# Patient Record
Sex: FEMALE | Race: WHITE
[De-identification: names, ages, dates, MRNs, and addresses within clinical notes are randomized per-mention and may not be internally consistent; named-entity substitution may affect disease eponyms.]

---

## 2019-01-08 ENCOUNTER — HOSPITAL ENCOUNTER (OUTPATIENT)
Dept: HOSPITAL 76 - DI | Age: 74
Discharge: HOME | End: 2019-01-08
Attending: INTERNAL MEDICINE
Payer: MEDICARE

## 2019-01-08 DIAGNOSIS — I51.9: ICD-10-CM

## 2019-01-08 DIAGNOSIS — I25.5: Primary | ICD-10-CM

## 2019-01-08 PROCEDURE — 93306 TTE W/DOPPLER COMPLETE: CPT

## 2020-02-04 ENCOUNTER — HOSPITAL ENCOUNTER (OUTPATIENT)
Dept: HOSPITAL 76 - DI | Age: 75
Discharge: HOME | End: 2020-02-04
Attending: INTERNAL MEDICINE
Payer: MEDICARE

## 2020-02-04 DIAGNOSIS — Z12.31: Primary | ICD-10-CM

## 2020-02-04 DIAGNOSIS — Z90.11: ICD-10-CM

## 2020-02-04 DIAGNOSIS — Z92.3: ICD-10-CM

## 2020-02-04 DIAGNOSIS — Z85.3: ICD-10-CM

## 2020-02-04 PROCEDURE — 77063 BREAST TOMOSYNTHESIS BI: CPT

## 2020-02-18 NOTE — MAMMOGRAPHY REPORT
Reason:  ROUTINE MAMMO

Procedure Date:  02/04/2020   

Accession Number:  [id number] / [id number]                    

Procedure:  JONEL - Screening Mammo Left w/Pablo CPT Code:  

 

***Final Report***

 

 

FULL RESULT:

 

 

EXAM: Screening Mammo Left w/Pablo

 

DATE: 2/4/2020 4:16 PM

 

CLINICAL HISTORY:  Screening encounter. History of benign left breast 

biopsy. Personal history of right breast cancer status post mastectomy in 

2013.

 

TECHNIQUE: (L) - Left  CC and MLO views were obtained.

 

COMPARISON: None

 

PARENCHYMAL PATTERN: (D) - The breast(s) demonstrate(s) heterogeneously 

dense fibroglandular parenchyma.

 

FINDINGS:

Postsurgical changes consistent was provided history of prior biopsy are 

identified. There are coarse typically benign calcifications. There are 

no suspicious masses, calcifications, or areas of distortion.

 

IMPRESSION: Benign findings. BI-RADS category 2.

 

RECOMMENDATION: (ANNUAL)  - Recommend routine annual screening 

mammography.

 

BI-RADS CATEGORY: (2) - Benign Findings.

 

STANDARD QUALIFYING STATEMENTS:

1.  This examination was not reviewed with the aid of Computer-Aided 

Detection (CAD).

2.  A negative or benign  imaging report should not preclude biopsy if 

clinically suspicious findings are present.

3.  Dense breasts may obscure an underlying neoplasm.

4. This examination was reviewed with the aid of 3D breast imaging 

(tomosynthesis).

## 2020-08-20 ENCOUNTER — HOSPITAL ENCOUNTER (OUTPATIENT)
Dept: HOSPITAL 76 - DI | Age: 75
Discharge: HOME | End: 2020-08-20
Attending: INTERNAL MEDICINE
Payer: MEDICARE

## 2020-08-20 DIAGNOSIS — M25.712: Primary | ICD-10-CM

## 2020-08-20 NOTE — XRAY REPORT
PROCEDURE:  Shoulder 3 View LT

 

INDICATIONS:  LT SHOULDER PAIN

 

TECHNIQUE:  3 views of the shoulder were acquired.  

 

COMPARISON:  Chest plain film which allowing excellent visualization of the left shoulder from 4/15/2
018 is reviewed..

 

FINDINGS:  

 

Bones:  No definite fractures or dislocations, but there are suspicious bony lesions involving the di
stal clavicle where osteolytic change appears present, multifocal, predominantly involving the middle
 and distal thirds of the left clavicle to the degree that there appears to be potential for patholog
ic fracture. In contrast to the small portion of the remaining visualized chest does not show definit
e similar bony lesions..  Visualized ribs appear intact.  

 

Soft tissues:  No suspicious soft tissue calcifications.  

 

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
 without similar additional lesions visualized over the left shoulder elsewhere in the underlying robb
st wall osseous elements. Recommend consideration of skeletal survey if multiple myeloma is clinicall
y suspected. Nuclear medicine bone scan also may be warranted at this time.

 

Reviewed by: Ascencion Raza MD on 8/20/2020 5:06 PM PDT

Approved by: Ascencion Raza MD on 8/20/2020 5:06 PM PDT

 

 

Station ID:  [id number]

## 2020-11-24 ENCOUNTER — HOSPITAL ENCOUNTER (OUTPATIENT)
Dept: HOSPITAL 76 - DI | Age: 75
Discharge: HOME | End: 2020-11-24
Attending: INTERNAL MEDICINE
Payer: MEDICARE

## 2020-11-24 DIAGNOSIS — R93.7: Primary | ICD-10-CM

## 2020-11-24 DIAGNOSIS — M16.0: ICD-10-CM

## 2020-11-24 DIAGNOSIS — Z85.3: ICD-10-CM

## 2020-11-24 DIAGNOSIS — M47.817: ICD-10-CM

## 2020-11-24 DIAGNOSIS — M47.812: ICD-10-CM

## 2020-11-24 PROCEDURE — 77075 RADEX OSSEOUS SURVEY COMPL: CPT

## 2020-11-25 NOTE — XRAY REPORT
PROCEDURE: Skeletal Survey

 

INDICATIONS: LYCTIC LESIONS LT CLAVICLE

 

TECHNIQUE: AP and lateral views of the spine as well as extremities and pelvis were obtained.

 

COMPARISON: None.

 

FINDINGS: 

As identified on prior exam, lytic appearance is noted within the distal left clavicle. Similar appea
perez is also noted within the left inferior pubic ramus. No additional areas of lytic abnormality ar
e identified. There is a questionable appearance of healing pathologic fracture within the distal cla
vicle.

 

Multilevel degenerative changes are present within the cervical spine including disc space narrowing 
and anterior osteophytes. Multilevel degenerative changes including disc and foraminal narrowing are 
present within the lumbar spine most notable at L5-S1. Multilevel anterior osteophytes are present. B
ilateral degenerative hip joint space narrowing is present, moderate in degree.

 

IMPRESSION: 

Lytic foci within the left distal clavicle as well as inferior left pubic ramus. Overall, there are c
oncerning for malignancy, whether metastatic or potentially myelomatous disease. Further evaluation w
ith bone scan may be obtained.

 

Reviewed by: Greta Brown MD on 11/25/2020 1:24 PM [facility]

Approved by: Greta Brown MD on 11/25/2020 1:24 PM [facility]

 

 

Station ID:  [id number]

## 2021-06-27 ENCOUNTER — HOSPITAL ENCOUNTER (EMERGENCY)
Dept: HOSPITAL 76 - ED | Age: 76
Discharge: HOME | End: 2021-06-27
Payer: MEDICARE

## 2021-06-27 ENCOUNTER — HOSPITAL ENCOUNTER (OUTPATIENT)
Dept: HOSPITAL 76 - EMS | Age: 76
End: 2021-06-27
Payer: MEDICARE

## 2021-06-27 VITALS — DIASTOLIC BLOOD PRESSURE: 84 MMHG | SYSTOLIC BLOOD PRESSURE: 149 MMHG

## 2021-06-27 DIAGNOSIS — Z79.82: ICD-10-CM

## 2021-06-27 DIAGNOSIS — Z95.1: ICD-10-CM

## 2021-06-27 DIAGNOSIS — K59.00: Primary | ICD-10-CM

## 2021-06-27 DIAGNOSIS — R14.0: ICD-10-CM

## 2021-06-27 DIAGNOSIS — E86.0: ICD-10-CM

## 2021-06-27 DIAGNOSIS — I10: ICD-10-CM

## 2021-06-27 DIAGNOSIS — E11.9: ICD-10-CM

## 2021-06-27 DIAGNOSIS — Z85.3: ICD-10-CM

## 2021-06-27 DIAGNOSIS — Z79.84: ICD-10-CM

## 2021-06-27 DIAGNOSIS — R10.30: Primary | ICD-10-CM

## 2021-06-27 DIAGNOSIS — Z08: ICD-10-CM

## 2021-06-27 PROCEDURE — 99284 EMERGENCY DEPT VISIT MOD MDM: CPT

## 2021-06-27 PROCEDURE — 99283 EMERGENCY DEPT VISIT LOW MDM: CPT

## 2021-06-27 NOTE — ED PHYSICIAN DOCUMENTATION
PD HPI ABD PAIN





- Stated complaint


Stated Complaint: STOMACH PX





- Chief complaint


Chief Complaint: Abd Pain





- History obtained from


History obtained from: Patient, Family





- History of Present Illness


Timing - onset: How many days ago (5)


Timing - duration: Days (5)


Timing - details: Gradual onset, Still present


Quality: Cramping, Fullness/distended, Pain


Location: All over / everywhere


Improved by: Position


Worsened by: Position, Palpation


Associated symptoms: Constipation.  No: Fever, Nausea, Vomiting


Similar symptoms before: Has not had sx before


Recently seen: Not recently seen





- Additional information


Additional information: 





75-year-old female with history of diabetes hypertension breast cancer and s/p 

CABG has developed constipation.  She is not usually constipated and does not 

recall going 5 days without a bowel movement in her life.  She has started on 

some furosemide in addition to the Aldactone she normally takes and she has had 

some improvement in her leg swelling.





Review of Systems


Constitutional: denies: Fever


Nose: denies: Congestion


Respiratory: denies: Cough


GI: reports: Abdominal Pain, Constipation.  denies: Nausea, Vomiting, Diarrhea


: denies: Dysuria, Frequency


Skin: denies: Rash


Musculoskeletal: denies: Neck pain, Back pain, Extremity pain


Neurologic: denies: Generalized weakness, Focal weakness, Numbness





PD PAST MEDICAL HISTORY





- Past Medical History


Cardiovascular: High cholesterol


Endocrine/Autoimmune: Type 2 diabetes


GI: None


: None


HEENT: None


Psych: None


Musculoskeletal: None


Derm: None





- Past Surgical History


Past Surgical History: Yes


/GYN: Mastectomy





- Present Medications


Home Medications: 


                                Ambulatory Orders











 Medication  Instructions  Recorded  Confirmed


 


Aspirin Chewable [[place] Santos 162 mg PO DAILY 06/27/21 06/27/21





Aspirin]   


 


Atorvastatin Calcium 40 mg PO DAILY 06/27/21 06/27/21


 


Atorvastatin [Lipitor] 40 mg PO QPM 06/27/21 06/27/21


 


Furosemide [Lasix] 80 mg PO DAILY 06/27/21 06/27/21


 


Lactobacillus Combination No.4 1 cap PO DAILY 06/27/21 06/27/21





[Probiotic]   


 


Lisinopril [Zestril] 20 mg PO BID 06/27/21 06/27/21


 


Metoprolol Succinate [Toprol Xl] 100 mg PO DAILY 06/27/21 06/27/21


 


Multivitamin 1 tab PO DAILY 06/27/21 06/27/21


 


Potassium Chloride 10 meq PO DAILY 06/27/21 06/27/21


 


Spironolactone [Aldactone] 25 mg PO DAILY 06/27/21 06/27/21


 


glipiZIDE [Glucotrol] 5 mg PO DAILY 06/27/21 06/27/21


 


metFORMIN [Glucophage] 1,000 mg PO BID 06/27/21 06/27/21


 


metFORMIN [Glucophage] 500 mg PO BID 06/27/21 06/27/21














- Allergies


Allergies/Adverse Reactions: 


                                    Allergies











Allergy/AdvReac Type Severity Reaction Status Date / Time


 


No Known Drug Allergies Allergy   Verified 06/27/21 08:54














- Social History


Does the pt smoke?: No


Smoking Status: Never smoker





PD ED PE NORMAL





- Vitals


Vital signs reviewed: Yes (Tachycardic and hypertensive)





- General


General: Alert and oriented X 3, No acute distress, Well developed/nourished





- HEENT


HEENT: Atraumatic, PERRL, EOMI





- Cardiac


Cardiac: RRR, No murmur





- Respiratory


Respiratory: No respiratory distress, Clear bilaterally





- Abdomen


Abdomen: Normal bowel sounds, Soft, Non distended, No organomegaly, Other (Mild 

generalized tenderness without guarding or rebound tenderness.)





- Back


Back: No CVA TTP, No spinal TTP





- Derm


Derm: Normal color, Warm and dry, No rash





- Extremities


Extremities: No deformity, No edema





- Neuro


Neuro: Alert and oriented X 3, CNs 2-12 intact, No motor deficit, No sensory 

deficit, Normal speech


Eye Opening: Spontaneous


Motor: Obeys Commands


Verbal: Oriented


GCS Score: 15





- Psych


Psych: Normal mood, Normal affect





Results





- Vitals


Vitals: 


                               Vital Signs - 24 hr











  06/27/21 06/27/21 06/27/21





  08:49 10:53 12:00


 


Temperature 36.7 C  


 


Heart Rate 112 H 114 H 60


 


Respiratory 18 20 18





Rate   


 


Blood Pressure 151/76 H 170/100 H 149/84 H


 


O2 Saturation 97 99 100








                                     Oxygen











O2 Source                      Room air

















PD MEDICAL DECISION MAKING





- ED course


Complexity details: reviewed results, re-evaluated patient, considered 

differential, d/w patient, d/w family


ED course: 





75-year-old female accompanied by her  comes to the emergency department 

with acute complaint of constipation and resulting abdominal pain.  She has been

 on some diuretic and is a bit dehydrated.  I did not find other specific reason

 for the patient to be constipated but she did respond to treatment with an 

enema.  She produced a large quantity of stool and felt much improved.





Departure





- Departure


Disposition: 01 Home, Self Care


Clinical Impression: 


Constipation


Qualifiers:


 Constipation type: unspecified constipation type Qualified Code(s): K59.00 - 

Constipation, unspecified





Condition: Stable


Instructions:  ED Constipation


Follow-Up: 


Greg Mcdaniels MD [Primary Care Provider] - 


Discharge Date/Time: 06/27/21 12:37

## 2021-09-02 ENCOUNTER — HOSPITAL ENCOUNTER (OUTPATIENT)
Dept: HOSPITAL 76 - DI | Age: 76
Discharge: HOME | End: 2021-09-02
Attending: INTERNAL MEDICINE
Payer: MEDICARE

## 2021-09-02 DIAGNOSIS — I11.0: Primary | ICD-10-CM

## 2021-09-02 DIAGNOSIS — N28.1: ICD-10-CM

## 2021-09-02 DIAGNOSIS — N20.0: ICD-10-CM

## 2021-09-02 DIAGNOSIS — I48.91: ICD-10-CM

## 2021-09-02 DIAGNOSIS — I50.9: ICD-10-CM

## 2021-09-02 DIAGNOSIS — I34.0: ICD-10-CM

## 2021-09-02 DIAGNOSIS — I27.20: ICD-10-CM

## 2021-09-02 PROCEDURE — 93306 TTE W/DOPPLER COMPLETE: CPT

## 2021-09-02 PROCEDURE — 93975 VASCULAR STUDY: CPT

## 2021-09-02 NOTE — ULTRASOUND REPORT
PROCEDURE:  Arterial Visceral Complete

 

INDICATIONS:  CHF,HTN

 

TECHNIQUE:  Real time scanning was performed of both kidneys, followed by Color and pulsed Doppler in
terrogation of the renal vessels.  

 

COMPARISON:  None.

 

FINDINGS:  Markedly suboptimal evaluation secondary to difficulty with breath-holding and bowel gas.

Aortic peak systolic velocity:  57.8 cm/s. Calcified plaque is noted.

 

Right side:  

Gray-scale imaging:  Kidney is 10.7 cm long;  No hydronephrosis.  No nephrolithiasis.  Renal cortex i
s normal in echogenicity.  No suspicious solid renal masses.  

Proximal renal artery peak systolic velocity:  99 cm/s.  

Mid renal artery peak systolic velocity:  89 cm/s.  

Distal renal artery peak systolic velocity:  54 cm/s.  

Renal vein:  Patent, without thrombus.  

Peak renal/aortic ratio (RAR):  1.7.  

 

Left side:  

Gray-scale imaging:  Kidney is 9.6 cm long;  No hydronephrosis. Left renal calculi with nonobstructiv
e appearance. 1.3 cm left renal cyst..  Renal cortex is normal in echogenicity.  No suspicious solid 
renal masses.  

Proximal renal artery peak systolic velocity:  37 cm/s.  

Mid-renal artery peak systolic velocity:  35 cm/s.  

Distal renal artery peak systolic velocity:  31 cm/s.  

Renal vein:  Patent, without thrombus.  

Peak renal/aortic ratio (RAR):  0.7.  

 

IMPRESSION:  

Overall, no specific sonographic criteria for renal artery stenosis however suboptimal examination se
condary to difficulty with breath-holding and overlying bowel gas.

 

 Nonobstructive left nephrolithiasis.

 

Simple appearing left renal cyst.

 

 

Reviewed by: Mac Mccormack MD on 9/2/2021 4:40 PM PDT

Approved by: Mac Mccormack MD on 9/2/2021 4:40 PM PDT

 

 

Station ID:  [id number]

## 2021-12-31 ENCOUNTER — HOSPITAL ENCOUNTER (OUTPATIENT)
Dept: HOSPITAL 76 - DI | Age: 76
Discharge: HOME | End: 2021-12-31
Attending: INTERNAL MEDICINE
Payer: MEDICARE

## 2021-12-31 DIAGNOSIS — I50.21: ICD-10-CM

## 2021-12-31 DIAGNOSIS — I25.10: ICD-10-CM

## 2021-12-31 DIAGNOSIS — I11.0: Primary | ICD-10-CM

## 2021-12-31 DIAGNOSIS — I34.0: ICD-10-CM

## 2021-12-31 PROCEDURE — 93306 TTE W/DOPPLER COMPLETE: CPT

## 2022-03-07 ENCOUNTER — HOSPITAL ENCOUNTER (OUTPATIENT)
Dept: HOSPITAL 76 - LAB.N | Age: 77
Discharge: HOME | End: 2022-03-07
Attending: NURSE PRACTITIONER
Payer: MEDICARE

## 2022-03-07 ENCOUNTER — HOSPITAL ENCOUNTER (OUTPATIENT)
Dept: HOSPITAL 76 - DI.N | Age: 77
Discharge: HOME | End: 2022-03-07
Attending: NURSE PRACTITIONER
Payer: MEDICARE

## 2022-03-07 DIAGNOSIS — I83.90: ICD-10-CM

## 2022-03-07 DIAGNOSIS — L97.909: Primary | ICD-10-CM

## 2022-03-07 PROCEDURE — 87181 SC STD AGAR DILUTION PER AGT: CPT

## 2022-03-07 PROCEDURE — 87205 SMEAR GRAM STAIN: CPT

## 2022-03-07 PROCEDURE — 87077 CULTURE AEROBIC IDENTIFY: CPT

## 2022-03-07 PROCEDURE — 87070 CULTURE OTHR SPECIMN AEROBIC: CPT

## 2022-03-07 NOTE — XRAY REPORT
PROCEDURE:  Tib/Fib LT

 

INDICATIONS:  NON-PRESSURE CHRONIC ULCER OF LOWER LEG

 

TECHNIQUE:  2 views of the tibia and fibula were acquired.  

 

COMPARISON:  None.

 

FINDINGS:  

 

No fracture or suspicious osseous lesion. No abnormal periosteal reaction identified. Surgical clips 
in the medial and posterior lower leg. No radiopaque foreign body otherwise.

 

IMPRESSION:  

No plain radiographic evidence of osteomyelitis. MRI recommended if there is continued clinical harshil
rn.

 

Reviewed by: Vern Youngblood MD on 3/7/2022 3:46 PM PST

Approved by: Vern Youngblood MD on 3/7/2022 3:46 PM PST

 

 

Station ID:  [id number]

## 2023-03-01 ENCOUNTER — HOSPITAL ENCOUNTER (EMERGENCY)
Dept: HOSPITAL 76 - ED | Age: 78
LOS: 1 days | Discharge: ANOTHER HOSPITAL | End: 2023-03-02
Payer: MEDICARE

## 2023-03-01 DIAGNOSIS — Z79.84: ICD-10-CM

## 2023-03-01 DIAGNOSIS — Z20.822: ICD-10-CM

## 2023-03-01 DIAGNOSIS — N30.80: Primary | ICD-10-CM

## 2023-03-01 DIAGNOSIS — M89.9: ICD-10-CM

## 2023-03-01 DIAGNOSIS — N13.2: ICD-10-CM

## 2023-03-01 DIAGNOSIS — E11.9: ICD-10-CM

## 2023-03-01 DIAGNOSIS — I48.91: ICD-10-CM

## 2023-03-01 DIAGNOSIS — I47.1: ICD-10-CM

## 2023-03-01 DIAGNOSIS — Z95.1: ICD-10-CM

## 2023-03-01 LAB
ALBUMIN DIAFP-MCNC: 2.6 G/DL (ref 3.2–5.5)
ALBUMIN DIAFP-MCNC: 3.2 G/DL (ref 3.2–5.5)
ALBUMIN/GLOB SERPL: 0.6 {RATIO} (ref 1–2.2)
ALBUMIN/GLOB SERPL: 0.6 {RATIO} (ref 1–2.2)
ALP SERPL-CCNC: 120 IU/L (ref 42–121)
ALP SERPL-CCNC: 150 IU/L (ref 42–121)
ALT SERPL W P-5'-P-CCNC: 27 IU/L (ref 10–60)
ALT SERPL W P-5'-P-CCNC: 32 IU/L (ref 10–60)
ANION GAP SERPL CALCULATED.4IONS-SCNC: 12 MMOL/L (ref 6–13)
ANION GAP SERPL CALCULATED.4IONS-SCNC: 18 MMOL/L (ref 6–13)
AST SERPL W P-5'-P-CCNC: 39 IU/L (ref 10–42)
AST SERPL W P-5'-P-CCNC: 44 IU/L (ref 10–42)
B PARAPERT DNA SPEC QL NAA+PROBE: NOT DETECTED
B PERT DNA SPEC QL NAA+PROBE: NOT DETECTED
BASE EXCESS BLDV CALC-SCNC: -4.4 MMOL/L
BASOPHILS NFR BLD AUTO: 0 10^3/UL (ref 0–0.1)
BASOPHILS NFR BLD AUTO: 0.5 %
BILIRUB BLD-MCNC: 0.2 MG/DL (ref 0.2–1)
BILIRUB BLD-MCNC: 1 MG/DL (ref 0.2–1)
BUN SERPL-MCNC: 28 MG/DL (ref 6–20)
BUN SERPL-MCNC: 28 MG/DL (ref 6–20)
C PNEUM DNA NPH QL NAA+NON-PROBE: NOT DETECTED
CALCIUM UR-MCNC: 8.4 MG/DL (ref 8.5–10.3)
CALCIUM UR-MCNC: 9.7 MG/DL (ref 8.5–10.3)
CHLORIDE SERPL-SCNC: 103 MMOL/L (ref 101–111)
CHLORIDE SERPL-SCNC: 96 MMOL/L (ref 101–111)
CLARITY UR REFRACT.AUTO: (no result)
CO2 BLDV-SCNC: 22.4 MMOL/L (ref 24–29)
CO2 SERPL-SCNC: 21 MMOL/L (ref 21–32)
CO2 SERPL-SCNC: 23 MMOL/L (ref 21–32)
CREAT SERPLBLD-SCNC: 1.2 MG/DL (ref 0.4–1)
CREAT SERPLBLD-SCNC: 1.2 MG/DL (ref 0.4–1)
EOSINOPHIL # BLD AUTO: 0.2 10^3/UL (ref 0–0.7)
EOSINOPHIL NFR BLD AUTO: 2.4 %
ERYTHROCYTE [DISTWIDTH] IN BLOOD BY AUTOMATED COUNT: 14.4 % (ref 12–15)
FLUAV RNA RESP QL NAA+PROBE: NOT DETECTED
GFRSERPLBLD MDRD-ARVRAT: 44 ML/MIN/{1.73_M2} (ref 89–?)
GFRSERPLBLD MDRD-ARVRAT: 44 ML/MIN/{1.73_M2} (ref 89–?)
GLOBULIN SER-MCNC: 4.3 G/DL (ref 2.1–4.2)
GLOBULIN SER-MCNC: 5.2 G/DL (ref 2.1–4.2)
GLUCOSE SERPL-MCNC: 201 MG/DL (ref 70–100)
GLUCOSE SERPL-MCNC: 358 MG/DL (ref 70–100)
GLUCOSE UR QL STRIP.AUTO: NEGATIVE MG/DL
HAEM INFLU B DNA SPEC QL NAA+PROBE: NOT DETECTED
HCO3 BLDMV-SCNC: 21.2 MMOL/L (ref 23–28)
HCOV 229E RNA SPEC QL NAA+PROBE: NOT DETECTED
HCOV HKU1 RNA UPPER RESP QL NAA+PROBE: NOT DETECTED
HCOV NL63 RNA ASPIRATE QL NAA+PROBE: NOT DETECTED
HCOV OC43 RNA SPEC QL NAA+PROBE: NOT DETECTED
HCT VFR BLD AUTO: 42.5 % (ref 37–47)
HGB UR QL STRIP: 12.9 G/DL (ref 12–16)
HMPV AG SPEC QL: NOT DETECTED
HPIV1 RNA NPH QL NAA+PROBE: NOT DETECTED
HPIV2 SPEC QL CULT: NOT DETECTED
HPIV3 AB TITR SER CF: NOT DETECTED {TITER}
HPIV4 RNA SPEC QL NAA+PROBE: NOT DETECTED
KETONES SERPL STRIP-SCNC: NEGATIVE MMOL/L
KETONES UR QL STRIP.AUTO: 15 MG/DL
LIPASE SERPL-CCNC: 32 U/L (ref 22–51)
LIPASE SERPL-CCNC: 36 U/L (ref 22–51)
LYMPHOCYTES # SPEC AUTO: 0.8 10^3/UL (ref 1.5–3.5)
LYMPHOCYTES NFR BLD AUTO: 9 %
M PNEUMO DNA SPEC QL NAA+PROBE: NOT DETECTED
MCH RBC QN AUTO: 27.4 PG (ref 27–31)
MCHC RBC AUTO-ENTMCNC: 30.4 G/DL (ref 32–36)
MCV RBC AUTO: 90.4 FL (ref 81–99)
MONOCYTES # BLD AUTO: 0.2 10^3/UL (ref 0–1)
MONOCYTES NFR BLD AUTO: 1.8 %
NEUTROPHILS # BLD AUTO: 7.2 10^3/UL (ref 1.5–6.6)
NEUTROPHILS # SNV AUTO: 8.3 X10^3/UL (ref 4.8–10.8)
NEUTROPHILS NFR BLD AUTO: 86.1 %
NITRITE UR QL STRIP.AUTO: NEGATIVE
NRBC # BLD AUTO: 0 /100WBC
NRBC # BLD AUTO: 0 X10^3/UL
PCO2 BLDV: 40.9 MMHG (ref 41–51)
PDW BLD AUTO: 11.5 FL (ref 7.9–10.8)
PH BLDV: 7.33 [PH] (ref 7.31–7.41)
PH UR STRIP.AUTO: 6 PH (ref 5–7.5)
PLATELET # BLD: 178 10^3/UL (ref 130–450)
PO2 BLDV: 61.1 MMHG (ref 25–47)
POTASSIUM SERPL-SCNC: 3.1 MMOL/L (ref 3.5–5)
POTASSIUM SERPL-SCNC: 4.6 MMOL/L (ref 3.5–5)
PROT SPEC-MCNC: 6.9 G/DL (ref 6.7–8.2)
PROT SPEC-MCNC: 8.4 G/DL (ref 6.7–8.2)
PROT UR STRIP.AUTO-MCNC: >=300 MG/DL
RBC # UR STRIP.AUTO: (no result) /UL
RBC # URNS HPF: (no result) /HPF (ref 0–5)
RBC MAR: 4.7 10^6/UL (ref 4.2–5.4)
RSV RNA RESP QL NAA+PROBE: NOT DETECTED
RV+EV RNA SPEC QL NAA+PROBE: NOT DETECTED
SAO2 DF BLDV: 90.8 % (ref 60–80)
SARS-COV-2 RNA PNL SPEC NAA+PROBE: NOT DETECTED
SODIUM SERPLBLD-SCNC: 135 MMOL/L (ref 135–145)
SODIUM SERPLBLD-SCNC: 138 MMOL/L (ref 135–145)
SP GR UR STRIP.AUTO: 1.02 (ref 1–1.03)
SQUAMOUS URNS QL MICRO: (no result)
UROBILINOGEN UR QL STRIP.AUTO: (no result) E.U./DL
UROBILINOGEN UR STRIP.AUTO-MCNC: NEGATIVE MG/DL
WBC # UR MANUAL: >25 /HPF (ref 0–5)

## 2023-03-01 PROCEDURE — 87086 URINE CULTURE/COLONY COUNT: CPT

## 2023-03-01 PROCEDURE — 87633 RESP VIRUS 12-25 TARGETS: CPT

## 2023-03-01 PROCEDURE — 80053 COMPREHEN METABOLIC PANEL: CPT

## 2023-03-01 PROCEDURE — 96376 TX/PRO/DX INJ SAME DRUG ADON: CPT

## 2023-03-01 PROCEDURE — 85025 COMPLETE CBC W/AUTO DIFF WBC: CPT

## 2023-03-01 PROCEDURE — 82009 KETONE BODYS QUAL: CPT

## 2023-03-01 PROCEDURE — 83690 ASSAY OF LIPASE: CPT

## 2023-03-01 PROCEDURE — 81001 URINALYSIS AUTO W/SCOPE: CPT

## 2023-03-01 PROCEDURE — 96361 HYDRATE IV INFUSION ADD-ON: CPT

## 2023-03-01 PROCEDURE — 99285 EMERGENCY DEPT VISIT HI MDM: CPT

## 2023-03-01 PROCEDURE — 99291 CRITICAL CARE FIRST HOUR: CPT

## 2023-03-01 PROCEDURE — 84484 ASSAY OF TROPONIN QUANT: CPT

## 2023-03-01 PROCEDURE — 81003 URINALYSIS AUTO W/O SCOPE: CPT

## 2023-03-01 PROCEDURE — 74177 CT ABD & PELVIS W/CONTRAST: CPT

## 2023-03-01 PROCEDURE — 87040 BLOOD CULTURE FOR BACTERIA: CPT

## 2023-03-01 PROCEDURE — 93005 ELECTROCARDIOGRAM TRACING: CPT

## 2023-03-01 PROCEDURE — 96375 TX/PRO/DX INJ NEW DRUG ADDON: CPT

## 2023-03-01 PROCEDURE — 96365 THER/PROPH/DIAG IV INF INIT: CPT

## 2023-03-01 PROCEDURE — 87077 CULTURE AEROBIC IDENTIFY: CPT

## 2023-03-01 PROCEDURE — 82803 BLOOD GASES ANY COMBINATION: CPT

## 2023-03-01 PROCEDURE — 36415 COLL VENOUS BLD VENIPUNCTURE: CPT

## 2023-03-01 PROCEDURE — 87150 DNA/RNA AMPLIFIED PROBE: CPT

## 2023-03-01 PROCEDURE — 87181 SC STD AGAR DILUTION PER AGT: CPT

## 2023-03-01 RX ADMIN — ONDANSETRON STA MG: 2 INJECTION INTRAMUSCULAR; INTRAVENOUS at 22:36

## 2023-03-01 RX ADMIN — SODIUM CHLORIDE STA MLS/HR: 9 INJECTION, SOLUTION INTRAVENOUS at 22:27

## 2023-03-01 RX ADMIN — SODIUM CHLORIDE STA MLS/HR: 9 INJECTION, SOLUTION INTRAVENOUS at 18:13

## 2023-03-01 RX ADMIN — ONDANSETRON STA MG: 2 INJECTION INTRAMUSCULAR; INTRAVENOUS at 18:09

## 2023-03-01 RX ADMIN — ADENOSINE STA MG: 3 INJECTION, SOLUTION INTRAVENOUS at 21:25

## 2023-03-01 RX ADMIN — SODIUM CHLORIDE, POTASSIUM CHLORIDE, SODIUM LACTATE AND CALCIUM CHLORIDE STA MLS/HR: 600; 310; 30; 20 INJECTION, SOLUTION INTRAVENOUS at 23:03

## 2023-03-01 RX ADMIN — WATER ONE ML: 1 INJECTION INTRAMUSCULAR; INTRAVENOUS; SUBCUTANEOUS at 20:42

## 2023-03-01 RX ADMIN — KETOROLAC TROMETHAMINE STA MG: 30 INJECTION, SOLUTION INTRAMUSCULAR at 18:59

## 2023-03-01 RX ADMIN — HYDROMORPHONE HYDROCHLORIDE STA MG: 1 INJECTION, SOLUTION INTRAMUSCULAR; INTRAVENOUS; SUBCUTANEOUS at 20:46

## 2023-03-01 RX ADMIN — INSULIN HUMAN STA UNIT: 100 INJECTION, SOLUTION PARENTERAL at 21:23

## 2023-03-01 RX ADMIN — SODIUM CHLORIDE STA MLS/HR: 9 INJECTION, SOLUTION INTRAVENOUS at 20:52

## 2023-03-01 RX ADMIN — METOPROLOL SUCCINATE SCH MG: 50 TABLET, EXTENDED RELEASE ORAL at 22:37

## 2023-03-01 RX ADMIN — WATER ONE ML: 1 INJECTION INTRAMUSCULAR; INTRAVENOUS; SUBCUTANEOUS at 20:09

## 2023-03-01 RX ADMIN — HYDROMORPHONE HYDROCHLORIDE STA MG: 1 INJECTION, SOLUTION INTRAMUSCULAR; INTRAVENOUS; SUBCUTANEOUS at 18:11

## 2023-03-01 RX ADMIN — METOPROLOL TARTRATE STA MG: 5 INJECTION INTRAVENOUS at 21:32

## 2023-03-01 NOTE — ED PHYSICIAN DOCUMENTATION
History of Present Illness





- Stated complaint


Stated Complaint: LLQ PX





- Chief complaint


Chief Complaint: Abd Pain





- Additonal information


Additional information: 





77-year-old woman presents to the emergency department for evaluation of acute 

onset left lower quadrant abdominal pain.  She states that for "quite some time"

she has been very tender in the lower abdomen making it difficult to move but it

got acutely worse this evening.  She reports daily bowel movements.  No melena 

hematochezia.  No chest pain or shortness of air.  She does have a history of 

congestive heart failure for which she is on a diuretic.  She is frequently inco

ntinent of urine because she cannot make it to the restroom fast enough.  She 

does have a history of previous kidney stones though this pain feels different 

and does not radiate.  She was given fentanyl and zofran IV by EMS with minimal 

improvement in symptoms.





Patient states she has never had a colonoscopy she simply does not want one.





PMH: Hypertension, diabetes, breast cancer, coronary artery disease status post 

CABG.





Review of Systems


Constitutional: denies: Fever, Chills


GI: reports: Abdominal Pain.  denies: Nausea, Vomiting, Constipation, Diarrhea


: reports: Reviewed and negative


Skin: reports: Reviewed and negative





PD PAST MEDICAL HISTORY





- Past Medical History


Past Medical History: Yes


Cardiovascular: Congestive heart failure, High cholesterol, Atrial fibrillation


Endocrine/Autoimmune: Type 2 diabetes


GI: None


GYN: Breast cancer


: None, Kidney stones


HEENT: None


Psych: None


Musculoskeletal: None


Derm: None


Other Past Medical History: lymphedema, chronic venous stasis ulcers





- Past Surgical History


Past Surgical History: Yes


/GYN: Mastectomy





- Present Medications


Home Medications: 


                                Ambulatory Orders











 Medication  Instructions  Recorded  Confirmed


 


Aspirin Chewable [St Santos 81 mg PO BID 06/27/21 03/02/23





Aspirin]   


 


Atorvastatin [Lipitor] 40 mg PO QPM 06/27/21 03/02/23


 


Lactobacillus Combination No.4 1 cap PO DAILY 06/27/21 03/02/23





[Probiotic]   


 


Metoprolol Succinate [Toprol Xl] 100 mg PO BID 06/27/21 03/02/23


 


Multivitamin 1 tab PO DAILY 06/27/21 03/02/23


 


glipiZIDE [Glucotrol] 5 mg PO DAILY 06/27/21 03/02/23


 


metFORMIN [Glucophage] 500 mg PO BID 06/27/21 03/02/23


 


Empagliflozin [Jardiance] 10 mg PO DAILY 03/29/22 03/02/23


 


Sacubitril/Valsartan [Entresto 97 1 tab PO BID 06/29/22 03/02/23





mg-103 mg Tablet]   


 


Bumetanide 0.5 mg PO BID 03/01/23 03/02/23


 


Acetaminophen [Tylenol] 500 mg PO Q4HR 03/02/23 03/02/23














- Allergies


Allergies/Adverse Reactions: 


                                    Allergies











Allergy/AdvReac Type Severity Reaction Status Date / Time


 


No Known Drug Allergies Allergy   Verified 03/01/23 17:27














- Social History


Does the pt smoke?: No


Smoking Status: Never smoker


Does the pt drink ETOH?: No


Does the pt have substance abuse?: No





- Immunizations


Immunizations are current?: Yes





PD ED PE NORMAL





- General


General: Alert and oriented X 3, No acute distress, Well developed/nourished





- HEENT


HEENT: Atraumatic





- Neck


Neck: Supple, no meningeal sign, No adenopathy





- Cardiac


Cardiac: RRR, No murmur





- Respiratory


Respiratory: No respiratory distress, Clear bilaterally





- Abdomen


Abdomen: Normal bowel sounds, Soft.  No: Non tender (Focal tenderness with mild 

guarding and rebound left lower quadrant of the abdomen.)





- Back


Back: No CVA TTP





- Derm


Derm: Normal color, Warm and dry





- Extremities


Extremities: No deformity





- Neuro


Neuro: Alert and oriented X 3, CNs 2-12 intact


Eye Opening: Spontaneous


Motor: Obeys Commands


Verbal: Oriented


GCS Score: 15





Results





- Vitals


Vitals: 


                               Vital Signs - 24 hr











  03/01/23 03/01/23 03/01/23





  17:24 18:52 19:07


 


Temperature 37 C  


 


Heart Rate 111 H 138 H 115 H


 


Respiratory 16 20 16





Rate   


 


Blood Pressure 211/93 H 224/117 H 203/16 H


 


O2 Saturation 99 95 92


 


If not protocol   





: Oxygen Flow,   





liters/minute   














  03/01/23 03/01/23 03/01/23





  20:12 20:41 21:22


 


Temperature   


 


Heart Rate 120 H 118 H 210 H


 


Respiratory 18 14 16





Rate   


 


Blood Pressure 149/67 H 157/75 H 132/87 H


 


O2 Saturation 97 94 97


 


If not protocol   





: Oxygen Flow,   





liters/minute   














  03/01/23 03/01/23 03/01/23





  21:30 21:35 21:40


 


Temperature 37.2 C  


 


Heart Rate 127 H 116 H 97


 


Respiratory 16  12





Rate   


 


Blood Pressure 130/68 130/68 111/72


 


O2 Saturation 97  92


 


If not protocol   





: Oxygen Flow,   





liters/minute   














  03/01/23 03/01/23 03/01/23





  22:30 23:00 23:34


 


Temperature   


 


Heart Rate 101 H 100 104 H


 


Respiratory 13 11 L 11 L





Rate   


 


Blood Pressure 167/87 H 115/64 105/73


 


O2 Saturation 100 99 99


 


If not protocol 2 2 2





: Oxygen Flow,   





liters/minute   














  03/02/23 03/02/23 03/02/23





  00:06 01:00 02:00


 


Temperature   


 


Heart Rate 106 H 99 99


 


Respiratory 17 11 L 17





Rate   


 


Blood Pressure 133/71 H 104/56 L 123/57 L


 


O2 Saturation 100 100 100


 


If not protocol 2 2 2





: Oxygen Flow,   





liters/minute   








                                     Oxygen











O2 Source                      Nasal cannula


 


Oxygen Flow Rate               2

















- EKG (time done)


  ** 1857


Rate: Rate (enter#) (142)


Rhythm: NSR


Intervals: Normal DE, Prolonged QT, LBBB


Compare to prior EKG: Changed from prior EKG


Computer interpretation: Agree with computer





  ** 2120


Rate: Rate (enter#) (200)


Rhythm: SVT


Computer interpretation: Agree with computer





  ** 2126


Rate: Rate (enter#) (118)


Rhythm: Sinus tachycardia


Intervals: Normal DE, Prolonged QT, Other (incomplete RBB)


Compare to prior EKG: Changed from prior EKG


Computer interpretation: Agree with computer





- Labs


Labs: 


                                  Microbiology











 03/01/23 19:59 Blood Culture (PCR) - Final





 Blood - Left Hand 


 


 03/01/23 19:56 Blood Culture - Preliminary





 Blood - Left Arm 


 


 03/01/23 19:59 Blood Culture - Preliminary





 Blood - Left Hand 








                                Laboratory Tests











  03/01/23 03/01/23 03/01/23





  18:23 18:44 18:44


 


WBC   8.3 


 


RBC   4.70 


 


Hgb   12.9 


 


Hct   42.5 


 


MCV   90.4 


 


MCH   27.4 


 


MCHC   30.4 L 


 


RDW   14.4 


 


Plt Count   178 


 


MPV   11.5 H 


 


Neut # (Auto)   7.2 H 


 


Lymph # (Auto)   0.8 L 


 


Mono # (Auto)   0.2 


 


Eos # (Auto)   0.2 


 


Baso # (Auto)   0.0 


 


Absolute Nucleated RBC   0.00 


 


Nucleated RBC %   0.0 


 


VBG pH   


 


VBG pCO2   


 


VBG pO2   


 


VBG HCO3   


 


VBG Total CO2   


 


VBG O2 Saturation   


 


VBG Base Excess   


 


Sodium    135


 


Potassium    4.6


 


Chloride    96 L


 


Carbon Dioxide    21


 


Anion Gap    18.0 H


 


BUN    28 H


 


Creatinine    1.2 H


 


Estimated GFR (MDRD)    44 L


 


Glucose    358 H


 


Calcium    9.7


 


Total Bilirubin    1.0


 


AST    44 H


 


ALT    32


 


Alkaline Phosphatase    150 H


 


Troponin I High Sens   


 


Total Protein    8.4 H


 


Albumin    3.2


 


Globulin    5.2 H


 


Albumin/Globulin Ratio    0.6 L


 


Lipase    36


 


Urine Color  YELLOW  


 


Urine Clarity  CLOUDY  


 


Urine pH  6.0  


 


Ur Specific Gravity  1.020  


 


Urine Protein  >=300 H  


 


Urine Glucose (UA)  NEGATIVE  


 


Urine Ketones  15 H  


 


Urine Occult Blood  LARGE H  


 


Urine Nitrite  NEGATIVE  


 


Urine Bilirubin  NEGATIVE  


 


Urine Urobilinogen  0.2 (NORMAL)  


 


Ur Leukocyte Esterase  NEGATIVE  


 


Urine RBC  TNTC H  


 


Urine WBC  >25 H  


 


Ur Squamous Epith Cells  RARE Squamous  


 


Urine Bacteria  Many H  


 


Ur Microscopic Review  INDICATED  


 


Urine Culture Comments  INDICATED  


 


Nasal Adenovirus (PCR)   


 


Nasal B. parapertussis DNA (PCR)   


 


Nasal Coronavir 229E PCR   


 


Nasal Coronavir HKU1 PCR   


 


Nasal Coronavir NL63 PCR   


 


Nasal Coronavir OC43 PCR   


 


Nasal Enterovir/Rhinovir PCR   


 


Nasal Influenza B PCR   


 


Nasal Influenza A PCR   


 


Nasal Parainfluen 1 PCR   


 


Nasal Parainfluen 2 PCR   


 


Nasal Parainfluen 3 PCR   


 


Nasal Parainfluen 4 PCR   


 


Nasal RSV (PCR)   


 


Nasal B.pertussis DNA PCR   


 


Nasal C.pneumoniae (PCR)   


 


Jacobo Human Metapneumo PCR   


 


Nasal M.pneumoniae (PCR)   


 


Nasal SARS-CoV-2 (PCR)   


 


Serum Ketones   














  03/01/23 03/01/23 03/01/23





  19:59 21:29 22:21


 


WBC   


 


RBC   


 


Hgb   


 


Hct   


 


MCV   


 


MCH   


 


MCHC   


 


RDW   


 


Plt Count   


 


MPV   


 


Neut # (Auto)   


 


Lymph # (Auto)   


 


Mono # (Auto)   


 


Eos # (Auto)   


 


Baso # (Auto)   


 


Absolute Nucleated RBC   


 


Nucleated RBC %   


 


VBG pH   


 


VBG pCO2   


 


VBG pO2   


 


VBG HCO3   


 


VBG Total CO2   


 


VBG O2 Saturation   


 


VBG Base Excess   


 


Sodium    138


 


Potassium    3.1 L


 


Chloride    103


 


Carbon Dioxide    23


 


Anion Gap    12.0


 


BUN    28 H


 


Creatinine    1.2 H


 


Estimated GFR (MDRD)    44 L


 


Glucose    201 H


 


Calcium    8.4 L


 


Total Bilirubin    0.2


 


AST    39


 


ALT    27


 


Alkaline Phosphatase    120


 


Troponin I High Sens   


 


Total Protein    6.9


 


Albumin    2.6 L


 


Globulin    4.3 H


 


Albumin/Globulin Ratio    0.6 L


 


Lipase    32


 


Urine Color   


 


Urine Clarity   


 


Urine pH   


 


Ur Specific Gravity   


 


Urine Protein   


 


Urine Glucose (UA)   


 


Urine Ketones   


 


Urine Occult Blood   


 


Urine Nitrite   


 


Urine Bilirubin   


 


Urine Urobilinogen   


 


Ur Leukocyte Esterase   


 


Urine RBC   


 


Urine WBC   


 


Ur Squamous Epith Cells   


 


Urine Bacteria   


 


Ur Microscopic Review   


 


Urine Culture Comments   


 


Nasal Adenovirus (PCR)   NOT DETECTED 


 


Nasal B. parapertussis DNA (PCR)   NOT DETECTED 


 


Nasal Coronavir 229E PCR   NOT DETECTED 


 


Nasal Coronavir HKU1 PCR   NOT DETECTED 


 


Nasal Coronavir NL63 PCR   NOT DETECTED 


 


Nasal Coronavir OC43 PCR   NOT DETECTED 


 


Nasal Enterovir/Rhinovir PCR   NOT DETECTED 


 


Nasal Influenza B PCR   NOT DETECTED 


 


Nasal Influenza A PCR   NOT DETECTED 


 


Nasal Parainfluen 1 PCR   NOT DETECTED 


 


Nasal Parainfluen 2 PCR   NOT DETECTED 


 


Nasal Parainfluen 3 PCR   NOT DETECTED 


 


Nasal Parainfluen 4 PCR   NOT DETECTED 


 


Nasal RSV (PCR)   NOT DETECTED 


 


Nasal B.pertussis DNA PCR   NOT DETECTED 


 


Nasal C.pneumoniae (PCR)   NOT DETECTED 


 


Jacobo Human Metapneumo PCR   NOT DETECTED 


 


Nasal M.pneumoniae (PCR)   NOT DETECTED 


 


Nasal SARS-CoV-2 (PCR)   NOT DETECTED 


 


Serum Ketones  SMALL H   NEGATIVE














  03/01/23 03/01/23





  22:21 22:21


 


WBC  


 


RBC  


 


Hgb  


 


Hct  


 


MCV  


 


MCH  


 


MCHC  


 


RDW  


 


Plt Count  


 


MPV  


 


Neut # (Auto)  


 


Lymph # (Auto)  


 


Mono # (Auto)  


 


Eos # (Auto)  


 


Baso # (Auto)  


 


Absolute Nucleated RBC  


 


Nucleated RBC %  


 


VBG pH   7.332


 


VBG pCO2   40.9 L


 


VBG pO2   61.1 H


 


VBG HCO3   21.2 L


 


VBG Total CO2   22.4 L


 


VBG O2 Saturation   90.8 H


 


VBG Base Excess   -4.4 L


 


Sodium  


 


Potassium  


 


Chloride  


 


Carbon Dioxide  


 


Anion Gap  


 


BUN  


 


Creatinine  


 


Estimated GFR (MDRD)  


 


Glucose  


 


Calcium  


 


Total Bilirubin  


 


AST  


 


ALT  


 


Alkaline Phosphatase  


 


Troponin I High Sens  254.2 H* 


 


Total Protein  


 


Albumin  


 


Globulin  


 


Albumin/Globulin Ratio  


 


Lipase  


 


Urine Color  


 


Urine Clarity  


 


Urine pH  


 


Ur Specific Gravity  


 


Urine Protein  


 


Urine Glucose (UA)  


 


Urine Ketones  


 


Urine Occult Blood  


 


Urine Nitrite  


 


Urine Bilirubin  


 


Urine Urobilinogen  


 


Ur Leukocyte Esterase  


 


Urine RBC  


 


Urine WBC  


 


Ur Squamous Epith Cells  


 


Urine Bacteria  


 


Ur Microscopic Review  


 


Urine Culture Comments  


 


Nasal Adenovirus (PCR)  


 


Nasal B. parapertussis DNA (PCR)  


 


Nasal Coronavir 229E PCR  


 


Nasal Coronavir HKU1 PCR  


 


Nasal Coronavir NL63 PCR  


 


Nasal Coronavir OC43 PCR  


 


Nasal Enterovir/Rhinovir PCR  


 


Nasal Influenza B PCR  


 


Nasal Influenza A PCR  


 


Nasal Parainfluen 1 PCR  


 


Nasal Parainfluen 2 PCR  


 


Nasal Parainfluen 3 PCR  


 


Nasal Parainfluen 4 PCR  


 


Nasal RSV (PCR)  


 


Nasal B.pertussis DNA PCR  


 


Nasal C.pneumoniae (PCR)  


 


Jacobo Human Metapneumo PCR  


 


Nasal M.pneumoniae (PCR)  


 


Nasal SARS-CoV-2 (PCR)  


 


Serum Ketones  














- Rads (name of study)


  ** CT abd w


Radiology: Final report received (Obstructing stone at the left UPJ with 

moderate hydronephrosis.  Left perinephric stranding striated left nephrogram 

and gas within the left renal collecting system most likely representing 

pyelonephritis secondary to a gas-forming organism.  Bladder consistent with 

emphysematous cystitis. ), See rad report, Other (Diffuse lytic lesions 

throughout the osseous structures with extensive bony destruction throughout the

 pelvis consistent with metastatic disease versus myeloma)





PD Medical Decision Making





- ED course


Complexity details: reviewed results, re-evaluated patient, considered 

differential, d/w patient, d/w family


ED course: 


77-year-old female who has a pmh of hypertension, diabetes, CAD status post 

CABGremote as well as a remote history of breast cancer status post mastectomy 

in 2013, presents to the emergency department with 1 week of increasing lower 

pelvic pain urinary incontinence difficulty walking and weakness.  She has had 

no fevers.





On presentation she has tachycardia but is normotensive.  She is afebrile. A 

broad differential was considered to include diverticulitis, urinary infections,

 pyelonephritis, kidney stones as well as metastatic disease.





I did obtain initial CBC that showed no leukocytosis or worrisome anemia.  Her 

electrolytes show a blood sugar 358 and small ketones.  She has a mildly 

elevated BUN and creatinine but in comparison to recent numbers it is 

essentially unchanged.  Mild chronic kidney disease.  Urinalysis which was 

completed via in and out cath is however frankly consistent with infection.





Here in the emergency department the patient was acutely in pain which improved 

after receiving IV Dilaudid. Upon the findings of small ketones in the serum 

there was concern for mild DKA given that she has a mildly elevated anion gap.  

Given a normal potassium I did administer 2 units of insulin as well as a total 

of 2 L of crystalloid.  Repeat electrolytes will be pending.





Subsequent CT imaging of the abdomen pelvis with contrast shows emphysematous 

cystitis with associated pyelonephritis.  She does have gas within the left 

renal collecting system.  There is also an obstructing left UPJ stone with 

associated hydro nephritis.  Given this finding blood cultures were obtained and

 2 g of ceftriaxone were administered IV.  With the finding of emphysematous 

cystitis a Solis catheter was placed in the emergency department.





Unfortunately the CT scan also showed diffuse lytic lesions throughout the bony 

structures consistent with metastatic disease versus myeloma.





Unfortunately with the obstructing stone she will need urological intervention. 

We are looking for a hospital with [facility] to accept in transfer and I have 

requested urology consult through [facility].  Locally there is a 

paucity of beds and transfer may prove exceedingly difficult.





2122: Notified by nursing staff that patient began to feel nauseated she 

immediately went into an SVT.  Baseline heart rate during this ED stay has been 

120 in sinus and she was seen to be in a narrow complex tachycardia with a 

sustained unchanging rate of 200.  She was immediately administered 6 mg of 

adenosine IV with subsequent return to sinus tachycardia with a heart rate of 

120.  She typically takes metoprolol 100 mg twice daily.  She is administered a 

single dose IV 5 mg at this time. on subsequent reevaluation she has a HR now of

 97 and BP of 111/70





2140: I spoke with Dr. Guzman urologist through [facility].  He 

agrees that the patient likely needs transfer for urinary stenting of the stone.

  He would recommend broadening the coverage of her antibiotics to include Zosyn

 for better gram-negative coverage.  Unfortunately [facility] does

 not have any beds.  Patient will be placed on their transfer list and we will 

continue to work with [facility] to obtain appropriate bed placement.  





I have ordered the patient's twice daily metoprolol succinate 100 mg to be 

administered.  I have also ordered the patient's Bumex that she typically takes 

if she has a history of heart failure.  Repeat labs are due at 2200.  We will 

reevaluate her anion gap as well as a presence of ketones in her chemistry and a

 troponin.  Patient will be signed out to my nighttime colleague Dr. Saini to 

follow-up on any overnight events including repeat labs and possibility of 

transfer








- Critical Care


Time(min): 45


Time Includes: Direct patient care, Review records, Reassess patient, Document 

care, Coordinate care, Medical consult


Data interpretation: Labs, Prior EKG


Procedures excluded from critical care time: See progress note





Departure





- Departure


Disposition: 02 Transfer Acute Care Hosp


Clinical Impression: 


 Emphysematous cystitis, Pyelonephritis, Obstruction of ureteropelvic junction 

(UPJ) due to stone, Lytic bone lesions on xray, SVT (supraventricular 

tachycardia)





Discharge Date/Time: 03/02/23 03:09

## 2023-03-01 NOTE — CT REPORT
PROCEDURE:  ABDOMEN/PELVIS W

 

INDICATIONS:  Abdominal pain, acute, nonlocalized

 

CONTRAST: 100mL Omni 300 

 

TECHNIQUE:  

After the administration of intravenous contrast, 5 mm thick sections acquired from the diaphragms to
 the symphysis.  5 mm thick coronal and sagittal reformats were acquired.  For radiation dose reducti
on, the following was used:  automated exposure control, adjustment of mA and/or kV according to faby
ent size.  

 

COMPARISON:  None.

 

FINDINGS:  

Image quality: There is mild motion artifact.  

 

Lung bases:There is mild dependent atelectasis.

Heart: Heart is normal in size.

 

ABDOMEN:

Liver: No mass lesion.

Gallbladder: There is a small amount of dependent high attenuation within the gallbladder which may 
represent gallstones or hyperdense biliary sludge. No gallbladder wall thickening or pericholecystic 
fluid.

Biliary ducts: No biliary ductal dilatation.

Pancreas: Unremarkable.

Spleen: Normal in size.

Adrenal Glands: No adrenal nodules.

Kidneys and Ureters:There is an obstructing urinary stone measuring up to 1.0 cm at the left uretero
pelvic junction with attenuation values of approximately 800-900 Hounsfield units. There is associate
d moderate left hydronephrosis with perinephric stranding. There is heterogeneous enhancement of the 
left kidney suggestive of a striated nephrogram. Foci of gas are demonstrated within the left renal c
ollecting system suspicious for infection from a gas-forming organism. No discrete perinephric or int
rarenal abscess. The right kidney demonstrates no hydronephrosis or definite renal stones. No definit
e evidence of pyelonephritis in the right kidney.

 

Stomach and Bowel: Stomach, small bowel loops, and colon are normal in caliber and wall thickness. N
o evidence of appendicitis.

 

Peritoneum: No abnormal intraperitoneal fluid. No free air.

 

Ventral Wall:  No hernia.

Abdominal Nodes: No retroperitoneal or mesenteric adenopathy by size criteria.

Vessels: Aorta and inferior vena cava are normal in size.

 

PELVIS:

Pelvic Organs: Unremarkable.

Bladder:There is bladder wall thickening with gas in the bladder wall compatible with emphysematous 
cystitis.

Pelvic Nodes: No enlarged lymph nodes.

Miscellaneous: No inguinal hernias.

 

Bones: There are diffuse lytic bony lesions throughout the bony pelvis within the sacrum, bilateral i
liac bones, left acetabulum, left ischial tuberosity, and left inferior pubic ramus with associated b
enio destruction. There are also suspected lesions in the femoral necks with associated small focus of
 cortical erosion anteriorly in the right femoral neck. Additional lesions are also suspected through
out the spine without lumbar compression fractures. The findings are consistent with metastatic disea
se.

 

IMPRESSION:  

 

1. Obstructing urinary stone at the left UPJ with moderate left hydronephrosis. Left perinephric stra
nding, striated left nephrogram, and gas within the left renal collecting system most likely represen
t pyonephritis secondary to a gas-forming organism. No perinephric or intrarenal abscess.

 

2. Gas within the bladder wall consistent with emphysematous cystitis.

 

3. Diffuse lytic lesions demonstrated throughout the visualized osseous structures with extensive bon
y destruction throughout the bony pelvis as described. The findings are consistent with metastatic di
sease versus myeloma.

 

Findings discussed with Janis Soria on 3/1/2023 at 8:14 PM.

 

Reviewed by: Sean Gutierrez MD on 3/1/2023 8:26 PM PST

Approved by: Sean Gutierrez MD on 3/1/2023 8:26 PM PST

 

 

Station ID:  [id number]

## 2023-03-02 ENCOUNTER — HOSPITAL ENCOUNTER (OUTPATIENT)
Dept: HOSPITAL 76 - EMS | Age: 78
Discharge: ANOTHER HOSPITAL | End: 2023-03-02
Attending: EMERGENCY MEDICINE
Payer: MEDICARE

## 2023-03-02 VITALS — SYSTOLIC BLOOD PRESSURE: 123 MMHG | DIASTOLIC BLOOD PRESSURE: 57 MMHG

## 2023-03-02 DIAGNOSIS — N20.1: ICD-10-CM

## 2023-03-02 DIAGNOSIS — I47.1: ICD-10-CM

## 2023-03-02 DIAGNOSIS — N30.80: Primary | ICD-10-CM

## 2023-03-02 RX ADMIN — HYDROMORPHONE HYDROCHLORIDE STA MG: 1 INJECTION, SOLUTION INTRAMUSCULAR; INTRAVENOUS; SUBCUTANEOUS at 02:28

## 2023-03-02 NOTE — ED PHYSICIAN DOCUMENTATION
ED Addendum





- Addendum


Addendum: 





03/02/23 07:47


I received signout on this patient from RADHA Soria; Please see her note for 

complete history and physical.


The test performed in the emergency department today show a left ureteral stone 

at the UPJ with left hydronephrosis As well as gas within the left renal 

collecting system. There is also gas within the bladder wall consistent with 

emphysematous cystitis.  Incidental note on this CAT scan is made of diffuse 

lytic lesions throughout the visualized osseous structures with extensive bone 

destruction throughout the bony pelvis suggestive of metastatic disease versus 

myeloma.  During patient's ED stay, she also had PSVT which resolved with 6 mg 

IVP adenosine.


At the time of turnover of care, patient has been in the emergency department 

for several hours due to lack of bed availability at appropriate facility 

(patient would be inappropriate for admission to [facility]).


During my shift, I was contacted by the  at [facility]; they do have a bed available and I was asked to contact the on-call 

urologist to ascertain whether patient would be appropriate for their facility 

from the urologic standpoint.  I was able to contact the urologist on-call, Dr. Gina Leung. She says she will make some phone calls to determine bed and OR 

availability.  I subsequently heard from the hospitalist at [facility] (Dr. Benito).  Dr. Benito excepts transfer to the hospital service at 

[facility], as the  indicates that she (the transfer 

coordinator) had heard from Dr. Leung and that the patient can be accepted to 

[facility] from the urologic standpoint.

## 2023-04-21 ENCOUNTER — HOSPITAL ENCOUNTER (OUTPATIENT)
Dept: HOSPITAL 76 - DI | Age: 78
Discharge: HOME | End: 2023-04-21
Attending: INTERNAL MEDICINE
Payer: MEDICARE

## 2023-04-21 DIAGNOSIS — M85.88: ICD-10-CM

## 2023-04-21 DIAGNOSIS — M47.816: Primary | ICD-10-CM

## 2023-04-21 DIAGNOSIS — M51.36: ICD-10-CM

## 2023-04-21 DIAGNOSIS — M43.17: ICD-10-CM

## 2023-04-22 NOTE — XRAY REPORT
PROCEDURE:  Lumbar Spine 2 View

 

INDICATIONS:  LOW BACK PAIN

 

TECHNIQUE:  2 views of the lumbar spine were acquired.  

 

COMPARISON:  None.

 

FINDINGS:  

 

Bones:  5 non-rib-bearing vertebrae are present.  Grade 2 anterolisthesis of L4-5-S1. There is normal
 bony alignment.  No vertebral body compression fractures.  No suspicious bony lesions.  Severe osteo
penia. Mild degenerative disc disease and facet arthropathy in lumbar spine.

 

Soft tissues:  Overlying bowel gas pattern is normal.  No suspicious soft tissue calcifications.  The
re is a left ureter stent. A 1 cm calcification is noted in the left upper abdomen, presumably a uret
eral stone.

 

IMPRESSION:

 

 

1. Grade 1 and 2 anterolisthesis of L5 on S1. Osseous structures are supplemented visualized due to s
evere osteopenia. Consider CT or MRI for follow-up.

2. Mild degenerative disc and facet disease in lumbar spine.

3. Suspect a proximal left ureter stone. There is a left ureter stent.

 

Reviewed by: Valerio Moore MD on 4/22/2023 1:17 PM PDT

Approved by: Valerio Moore MD on 4/22/2023 1:17 PM PDT

 

 

Station ID:  [id number]

## 2023-04-28 ENCOUNTER — HOSPITAL ENCOUNTER (EMERGENCY)
Dept: HOSPITAL 76 - ED | Age: 78
Discharge: HOME | End: 2023-04-28
Payer: MEDICARE

## 2023-04-28 ENCOUNTER — HOSPITAL ENCOUNTER (OUTPATIENT)
Dept: HOSPITAL 76 - EMS | Age: 78
Discharge: ANOTHER HOSPITAL | End: 2023-04-28
Payer: MEDICARE

## 2023-04-28 VITALS — SYSTOLIC BLOOD PRESSURE: 182 MMHG | DIASTOLIC BLOOD PRESSURE: 114 MMHG

## 2023-04-28 DIAGNOSIS — E11.9: ICD-10-CM

## 2023-04-28 DIAGNOSIS — Z79.899: ICD-10-CM

## 2023-04-28 DIAGNOSIS — E78.00: ICD-10-CM

## 2023-04-28 DIAGNOSIS — I48.91: ICD-10-CM

## 2023-04-28 DIAGNOSIS — I50.9: ICD-10-CM

## 2023-04-28 DIAGNOSIS — M54.41: Primary | ICD-10-CM

## 2023-04-28 DIAGNOSIS — Z79.84: ICD-10-CM

## 2023-04-28 DIAGNOSIS — Z79.82: ICD-10-CM

## 2023-04-28 DIAGNOSIS — M79.605: Primary | ICD-10-CM

## 2023-04-28 PROCEDURE — 80048 BASIC METABOLIC PNL TOTAL CA: CPT

## 2023-04-28 PROCEDURE — 82550 ASSAY OF CK (CPK): CPT

## 2023-04-28 PROCEDURE — 99284 EMERGENCY DEPT VISIT MOD MDM: CPT

## 2023-04-28 PROCEDURE — 93971 EXTREMITY STUDY: CPT

## 2023-04-28 PROCEDURE — 85025 COMPLETE CBC W/AUTO DIFF WBC: CPT

## 2023-04-28 NOTE — ULTRASOUND REPORT
PROCEDURE:  Duplex Ext Veins Right

 

INDICATIONS:  Pain, rule out blood clot

 

TECHNIQUE:  

Real-time imaging, as well as color and pulse Doppler interrogation, were performed of the lower extr
emity deep veins from the inguinal ligament to the popliteal fossa.  

 

COMPARISON:  None.

 

FINDINGS:  The peroneal and posterior tibial veins were not evaluated due to patient pain, bandages, 
and open wounds. The deep veins are normally compressible, and free of intraluminal thrombus.  Color 
and pulse Doppler demonstrate normal phasic intraluminal flow.  There is normal augmentation response
 to distal compression maneuver.  

 

IMPRESSION:  No evidence of right lower extremity deep venous thrombosis above the knee. Calf veins n
ot evaluated as above.

 

Reviewed by: Terry Moore MD on 4/28/2023 2:19 PM PDT

Approved by: Terry Moore MD on 4/28/2023 2:19 PM PDT

 

 

Station ID:  [id number]

## 2023-04-28 NOTE — ED PHYSICIAN DOCUMENTATION
History of Present Illness





- Stated complaint


Stated Complaint: R LEG PX





- Chief complaint


Chief Complaint: Back Pain





- Additonal information


Additional information: 


Right leg pain.  Reports several weeks of intermittent pain primarily to the 

right thigh.  Reports a history of sciatica involving the right leg.  Has had x-

rays performed by her outpatient physician and she was reported that she has 

severe osteopenia.  Denies history of trauma, fever, saddle paresthesias, loss 

of bowel bladder control.  Has chronic pain to her lower extremities 

bilaterally.  Was also recently hospitalized for left-sided ureteral stone 

complicated by urosepsis at [facility].








PD PAST MEDICAL HISTORY





- Past Medical History


Past Medical History: Yes


Cardiovascular: Congestive heart failure, High cholesterol, Atrial fibrillation


Endocrine/Autoimmune: Type 2 diabetes


GI: None


GYN: Breast cancer


: None, Kidney stones


HEENT: None


Psych: None


Musculoskeletal: None


Derm: None





- Past Surgical History


Past Surgical History: Yes


/GYN: Mastectomy





- Present Medications


Home Medications: 


                                Ambulatory Orders











 Medication  Instructions  Recorded  Confirmed


 


Aspirin Chewable [St Santos 81 mg PO BID 06/27/21 03/02/23





Aspirin]   


 


Atorvastatin [Lipitor] 40 mg PO QPM 06/27/21 03/02/23


 


Lactobacillus Combination No.4 1 cap PO DAILY 06/27/21 03/02/23





[Probiotic]   


 


Metoprolol Succinate [Toprol Xl] 100 mg PO BID 06/27/21 03/02/23


 


Multivitamin 1 tab PO DAILY 06/27/21 03/02/23


 


glipiZIDE [Glucotrol] 5 mg PO DAILY 06/27/21 03/02/23


 


metFORMIN [Glucophage] 500 mg PO BID 06/27/21 03/02/23


 


Empagliflozin [Jardiance] 10 mg PO DAILY 03/29/22 03/02/23


 


Sacubitril/Valsartan [Entresto 97 1 tab PO BID 06/29/22 03/02/23





mg-103 mg Tablet]   


 


Bumetanide 0.5 mg PO BID 03/01/23 03/02/23


 


Acetaminophen [Tylenol] 500 mg PO Q4HR 03/02/23 03/02/23














- Allergies


Allergies/Adverse Reactions: 


                                    Allergies











Allergy/AdvReac Type Severity Reaction Status Date / Time


 


No Known Drug Allergies Allergy   Verified 04/28/23 11:28














- Social History


Does the pt smoke?: No


Smoking Status: Never smoker


Does the pt drink ETOH?: No


Does the pt have substance abuse?: No





- Immunizations


Immunizations are current?: Yes





Results





- Vitals


Vitals: 


                                     Oxygen











O2 Source                      Room air

















PD Medical Decision Making





- ED course


ED course: 





Patient is a 77-year-old female presenting to the emergency department with 

right low back and right-sided leg pain.





She reports a somewhat complicated recent medical history including 

hospitalization for impacted ureteral stone complicated by infection as well as 

soft tissue infection that is left her with significant pain and stasis 

dermatitis changes to her lower extremities bilaterally.





She was accompanied by  who reports that she has been having increasing 

difficulty with mobility at home.  They report that they do have [facility] 

[facility] a few times a week which has been helping but for the most part the 

 has been the primary caretaker and has been responsible for all aspects 

of her activities of daily living including transitioning her from bed, to chair

 and bathroom.





She has been having increasing right-sided leg pain and thigh pain for the last 

1 to 2 weeks.  She denied any history of trauma to the leg.  She does report 

that she has been following carefully with her primary care doctor who has 

ordered outpatient x-rays.  She states that they demonstrated severe osteopenia 

but no acute fracture.





She was prescribed Percocet and a course of prednisone by her primary care 

doctor and she states that she took 1 dose and this seemed to help somewhat 

however did discuss her increasing pain with her primary care doctor and was 

referred to the emergency department for evaluation.





Patient arrived to the emergency department afebrile and otherwise 

hemodynamically stable condition.  Examination of her lower extremities did 

demonstrate significant dermatitis with stasis dermatitis ulceration, 

prominently on the dorsum of the right foot.  Patient was extremely sensitive in

 these areas and was intolerant to the removal of her left lower extremity 

dressing and was barely tolerant for my examination of her right lower 

extremity.





She denied any symptoms of fever, saddle paresthesia, bowel or bladder 

incontinence or identifiable new lower extremity weakness which would be of 

concern for cauda equina or spinal cord infection.





I had a long and detailed discussion with her as well as with her  about 

her state of health as well as the complications she has been having with her 

health over the course of the last several weeks to months.





The patient was offered a thorough evaluation in the emergency department for 

any dangerous or life-threatening cause of her symptoms.  She was offered lab 

work to evaluate for dehydration, electrolyte abnormality,Rhabdomyolysis or 

other potential findings however she declined blood work in the emergency 

department stating that she had recently had blood test done by her primary care

 doctor and did not feel it was necessary to repeat them at this time.





We discussed CT imaging of her lower extremity.  Discussed the possibility of 

small, occult or compression fracture that could be driving her symptoms.  She 

declined this intervention as well.





She was offered IV pain medication which she also declined stating that she 

would rather take oral medication. She was subsequently given a dose of her 

oxycodone which she reported significantly helped her symptoms.





She was amendable to ultrasonography of the lower extremities for evaluation for

 any DVT.  This study was negative however she was intolerant of examination of 

the vasculature of her lower extremity secondary to her chronic leg pain.





On reevaluation she did report feeling better after the administration of her 

oxycodone.  We had a long and detailed discussion about her overall health and 

wellbeing.  We discussed whether or not her current living situation is safe for

 her given her increasing dependence on her  for her activities of daily 

living.  She verbalized understanding of these things.





Ultimately she requested discharge from the emergency department stating that 

she wished to follow-up with her primary care doctor.  She was encouraged to 

return to the emergency department for any new or worsening symptoms.





Departure





- Departure


Disposition: 01 Home, Self Care


Clinical Impression: 


Back pain


Qualifiers:


 Back pain location: low back pain Chronicity: unspecified Back pain laterality:

 unspecified Sciatica presence: with sciatica Sciatica laterality: sciatica of 

right side Qualified Code(s): M54.41 - Lumbago with sciatica, right side





Sciatica


Qualifiers:


 Laterality: right Qualified Code(s): M54.31 - Sciatica, right side





Comments: 


Thank you for allowing us to care for you today [facility].





Today in the emergency department you are offered a thorough evaluation for any 

possible life-threatening medical emergency.





The ultrasound performed today did not show any acute blood clot in your lower 

extremity.





I know we discussed blood work and other testing in the emergency department 

which you have declined. We also discussed evaluation for possible placement in 

rehabilitation versus skilled nursing facility as well.  At this time You have 

decided to go home and follow-up on an outpatient basis. I am glad that you are 

feeling better after the administration of Roxicodone given here in the 

emergency department.  I would like you to continue to take your previously 

prescribed medication for pain control.  Please continue to follow carefully 

with your primary care doctor.  If it anytime you have new or worsening symptoms

 particularly if you develop any fever, numbness in the anogenital area, 

difficulties with bowel or bladder control or weakness in your lower extremities

 its important that you return to the emergency department immediately for 

reevaluation.


Discharge Date/Time: 04/28/23 14:57